# Patient Record
Sex: MALE | Race: AMERICAN INDIAN OR ALASKA NATIVE
[De-identification: names, ages, dates, MRNs, and addresses within clinical notes are randomized per-mention and may not be internally consistent; named-entity substitution may affect disease eponyms.]

---

## 2018-10-03 ENCOUNTER — HOSPITAL ENCOUNTER (OUTPATIENT)
Dept: HOSPITAL 5 - XRAY | Age: 63
Discharge: HOME | End: 2018-10-03
Attending: INTERNAL MEDICINE
Payer: COMMERCIAL

## 2018-10-03 DIAGNOSIS — Z90.49: ICD-10-CM

## 2018-10-03 DIAGNOSIS — M17.0: ICD-10-CM

## 2018-10-03 DIAGNOSIS — Z02.71: Primary | ICD-10-CM

## 2018-10-03 NOTE — XRAY REPORT
BILATERAL KNEES, 2 VIEWS



History: Knee pain.



Findings: Severe tricompartmental osteoarthritic changes are identified 

in the right knee. Left knee is within normal limits. The soft tissues 

are unremarkable.



Impression: Advanced degenerative changes in the right knee.

## 2021-04-28 ENCOUNTER — HOSPITAL ENCOUNTER (OUTPATIENT)
Dept: HOSPITAL 5 - CT | Age: 66
Discharge: HOME | End: 2021-04-28
Attending: PAIN MEDICINE
Payer: MEDICARE

## 2021-04-28 DIAGNOSIS — M51.36: ICD-10-CM

## 2021-04-28 DIAGNOSIS — M48.061: ICD-10-CM

## 2021-04-28 DIAGNOSIS — M47.816: Primary | ICD-10-CM

## 2021-04-28 DIAGNOSIS — M43.27: ICD-10-CM

## 2021-04-28 PROCEDURE — 72131 CT LUMBAR SPINE W/O DYE: CPT

## 2021-04-28 NOTE — CAT SCAN REPORT
CT lumbar spine without contrast



INDICATION:  LUMBAR SPONDYLOSIS.



TECHNIQUE:  Axial imaging performed through the lumbar spine without the use of contrast.  Sagittal a
nd coronal reconstructed images were also reviewed.  All CT scans at this location are performed usin
g CT dose reduction for ALARA by means of automated exposure control. 



COMPARISON:  None



FINDINGS: 



Alignment:  There is fixed grade 1 anterolisthesis of L4 on L5.  



Bones: There is a posterior construct spanning L4-S1. The bilateral S1 pedicle screws are fractured, 
both near the level of the cable construct. Moderate multilevel discogenic DJD and facet arthropathy 
is present. Posterior laminectomy changes are present at L4-5. There are also degenerative changes in
 the SI joints with partial ankylosis on the right.



Soft tissues:  No acute or significant incidental soft tissue abnormality.



T11/T12-L1/2: No significant disc bulge, canal stenosis, or foraminal stenosis at any of these levels
.



L2/3: Moderate discogenic DJD and facet arthropathy with small posterior disc/osteophyte complex and 
ligamentum flavum hypertrophy. Mild central canal stenosis and also right greater than left foraminal
 stenosis.



L3/4: Evaluation is limited by streak artifact. There is discogenic DJD and facet arthropathy with mi
ld bilateral foraminal stenosis.



L4/5: Partial bony ankylosis with no canal or foraminal stenosis identified.



L5/S1: Partial bony ankylosis with no gross foraminal stenosis identified. The canal is suboptimally 
evaluated due to streak artifact.





IMPRESSION:  

1. Posterior construct spanning L4-S1 with fracture of both S1 pedicle screws.

2. Multilevel spondylosis as above.



Signer Name: Derick Oakley MD 

Signed: 4/28/2021 3:18 PM

Workstation Name: [email or web address]